# Patient Record
Sex: MALE | Race: WHITE | NOT HISPANIC OR LATINO | Employment: FULL TIME | URBAN - NONMETROPOLITAN AREA
[De-identification: names, ages, dates, MRNs, and addresses within clinical notes are randomized per-mention and may not be internally consistent; named-entity substitution may affect disease eponyms.]

---

## 2018-12-12 ENCOUNTER — TRANSCRIBE ORDERS (OUTPATIENT)
Dept: ADMINISTRATIVE | Facility: HOSPITAL | Age: 33
End: 2018-12-12

## 2018-12-12 ENCOUNTER — HOSPITAL ENCOUNTER (OUTPATIENT)
Dept: RESPIRATORY THERAPY | Facility: HOSPITAL | Age: 33
Discharge: HOME | End: 2018-12-12
Admitting: PHYSICIAN ASSISTANT

## 2018-12-12 DIAGNOSIS — R00.2 PALPITATIONS: Primary | ICD-10-CM

## 2018-12-12 DIAGNOSIS — R00.2 PALPITATIONS: ICD-10-CM

## 2018-12-12 PROCEDURE — 93226 XTRNL ECG REC<48 HR SCAN A/R: CPT

## 2018-12-12 PROCEDURE — 93225 XTRNL ECG REC<48 HRS REC: CPT

## 2018-12-14 PROCEDURE — 93227 XTRNL ECG REC<48 HR R&I: CPT | Performed by: INTERNAL MEDICINE

## 2019-08-08 ENCOUNTER — OFFICE VISIT (OUTPATIENT)
Dept: ORTHOPEDIC SURGERY | Facility: CLINIC | Age: 34
End: 2019-08-08

## 2019-08-08 VITALS — WEIGHT: 315 LBS | HEIGHT: 74 IN | HEART RATE: 183 BPM | OXYGEN SATURATION: 99 % | BODY MASS INDEX: 40.43 KG/M2

## 2019-08-08 DIAGNOSIS — M76.61 RIGHT ACHILLES TENDINITIS: Primary | ICD-10-CM

## 2019-08-08 PROCEDURE — 99203 OFFICE O/P NEW LOW 30 MIN: CPT | Performed by: PHYSICIAN ASSISTANT

## 2019-08-08 RX ORDER — NAPROXEN 250 MG/1
250 TABLET ORAL 2 TIMES DAILY PRN
COMMUNITY

## 2019-08-08 NOTE — PROGRESS NOTES
[facility] [place] [facility] Note    Subjective     Patient: Manuel Reyes  : 1985    Primary Care Provider: Nikita Esqueda PA    Requesting Provider: As above    Pain of the Right Ankle      History    Chief Complaint: Right Achilles pain    History of Present Illness: This is a very pleasant 33-year-old male presenting today with complaints of right Achilles and heel pain since 2019.  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  Initially it was not very painful but later that day when he went to get up from a seated position he had excruciating pain.  He denies any swelling warmth or erythema.  He denies any radiating pain.  He rates pain to be 5/10 and dull and aching.  He is treated with anti-inflammatories, splinting, knee walker.  He has been nonweightbearing.  He has had no prior problems with his heel.  He is here for further evaluation and treatment recommendations.  He did have MRI on 2019 as well as x-rays on 2019.    Current Outpatient Medications on File Prior to Visit   Medication Sig Dispense Refill   • naproxen (NAPROSYN) 250 MG tablet Take 250 mg by mouth 2 (Two) Times a Day As Needed.       No current facility-administered medications on file prior to visit.       No Known Allergies   History reviewed. No pertinent past medical history.  History reviewed. No pertinent surgical history.  Family History   Problem Relation Age of Onset   • Hypertension Mother    • Parkinsonism Mother    • Diabetes Father    • Hypertension Father       Social History     Socioeconomic History   • Marital status:      Spouse name: Not on file   • Number of children: Not on file   • Years of education: Not on file   • Highest education level: Not on file   Tobacco Use   • Smoking status: Never Smoker   • Smokeless tobacco: Current User     Types: Chew   Substance and Sexual Activity   • Alcohol use:  "No     Frequency: Never   • Drug use: No   • Sexual activity: Defer        Review of Systems   Constitutional: Negative.    HENT: Negative.    Eyes: Negative.    Respiratory: Negative.    Cardiovascular: Negative.    Gastrointestinal: Negative.    Endocrine: Negative.    Genitourinary: Negative.    Musculoskeletal: Positive for joint swelling.   Skin: Negative.    Allergic/Immunologic: Negative.    Neurological: Negative.    Hematological: Negative.    Psychiatric/Behavioral: Negative.        The following portions of the patient's history were reviewed and updated as appropriate: allergies, current medications, past family history, past medical history, past social history, past surgical history and problem list.      Objective      Physical Exam  Pulse (!) 183   Ht 188 cm (74\")   Wt (!) 150 kg (330 lb)   SpO2 99%   BMI 42.37 kg/m²     Body mass index is 42.37 kg/m².    GENERAL: Body habitus: morbidly obese    Lower extremity edema: Left: trace; Right: trace    Varicose veins:  Left: none; Right: none    Gait: using knee walker     Mental Status:  awake and alert; oriented to person, place, and time    Voice:  clear  SKIN:  Normal    Hair Growth:  Right:normal; Left:  normal  HEENT: Head: Normocephalic, atraumatic,  without obvious abnormality.  eye: normal external eye, no icterus   PULM:  Repiratory effort normal    Ortho Exam  V:  Dorsalis Pedis:  Right: 2+; Left:2+    Posterior Tibial: Right:2+; Left:2+    Capillary Refill:  Brisk  MSK:  Hand:right handed      Tibia:  Right:  non tender; Left:  non tender      Ankle:  Right: tender Over the insertion of the Achilles tendon with no warmth or swelling.  Negative Key's test, ROM  normal and motor function  normal; Left:  non tender, ROM  normal and motor function  normal      Foot:  Right:  non tender; Left:  non tender      NEURO: Heel Walking:  Right:  painful; Left:  normal    Toe Walking:  Right:  limited ability, painful; Left:  " "normal     [place]-Gilbert 5.07 monofilament test: not evaluated    Lower extremity sensation: intact     Calf Atrophy:none    Motor Function: all 5/5          Medical Decision Making    Data Review:   reviewed radiology images    Assessment:  1. Right Achilles tendinitis        Plan:  Right insertional Achilles tendonitis, retrocalcaneal bursitis.  I reviewed MRI findings, clinical findings with the patient.  MRI from 8/7/2019 shows edema at the insertion of the Achilles tendon with no evidence of full-thickness tear.  I explained to the patient that this edema and bursitis with \"partial tear\" is the definition of tendinitis.  I do not see any evidence of rupture.  I have explained the problem to the patient in detail.  It is generally thought to be an overuse syndrome or due to chronic aging change.   Literature shows it is best treated with a 12 week course of physical therapy and night splinting.  I have shown the patient the 2 stretches to do at home (in addition to what PT shows them), and recommend they do them 5 reps, 6-8 times per day.  I explained that injections and orthotics will not help.  I wrote a prescription for physical therapy, and we explained where to obtain a night splint.. I have also given him samples of pen said    Follow-up in 4 months if not improved.    If not improved, I explained the next step is a short leg walking cast for 6 weeks.    Surgery is a last resort as it is only helpful in about 60% of the time in improving the pain from this problem.    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  Recommendation today is that he begin physical therapy and night splinting.  We have given him a tall walking boot for comfort.  He should remain off work until Tuesday, September 3.  He can come out of the boot when he is comfortable.  He understands a physical therapy and night splinting does not resolve his pain, he will return for " casting.    History, diagnosis and treatment plan discussed with Dr. Viera.              Zelda Grace PA-C  08/09/19  8:32 AM

## 2019-08-13 ENCOUNTER — TELEPHONE (OUTPATIENT)
Dept: ORTHOPEDIC SURGERY | Facility: CLINIC | Age: 34
End: 2019-08-13

## 2019-08-13 NOTE — TELEPHONE ENCOUNTER
PT called to let our office know the following information:  He wanted to reiterate that he has not had this problem before - has not ever been treated for tendonitis. He has been discussing this situation with his worker's comp and wanted our office to be aware of his concerns.

## 2019-08-19 ENCOUNTER — TELEPHONE (OUTPATIENT)
Dept: ORTHOPEDIC SURGERY | Facility: CLINIC | Age: 34
End: 2019-08-19

## 2019-08-20 ENCOUNTER — TELEPHONE (OUTPATIENT)
Dept: ORTHOPEDIC SURGERY | Facility: CLINIC | Age: 34
End: 2019-08-20

## 2019-08-20 NOTE — TELEPHONE ENCOUNTER
Returned call to pt and let him know I faxed his WC form to [facility] prior to 10 a.m. I was unable to confirm receipt with Darleen Lopez at [facility] but did send it. I LVM for Darleen after speaking with pt.     On 8/20 noticed there was a telephone message that had been routed by Laine to clinic pool to Pankaj asking for someone to contact Darleen Lopez back regarding some questions. I've contacted her today at 4:15 and LVM to return my call at my direct number.

## 2019-08-29 ENCOUNTER — OFFICE VISIT (OUTPATIENT)
Dept: ORTHOPEDIC SURGERY | Facility: CLINIC | Age: 34
End: 2019-08-29

## 2019-08-29 VITALS — OXYGEN SATURATION: 98 % | BODY MASS INDEX: 40.43 KG/M2 | HEIGHT: 74 IN | WEIGHT: 315 LBS | HEART RATE: 116 BPM

## 2019-08-29 DIAGNOSIS — M76.61 RIGHT ACHILLES TENDINITIS: Primary | ICD-10-CM

## 2019-08-29 PROCEDURE — 99212 OFFICE O/P EST SF 10 MIN: CPT | Performed by: PHYSICIAN ASSISTANT

## 2019-08-29 NOTE — PROGRESS NOTES
[facility] [place] [facility] [place]    Subjective     Patient: Manuel Reyes  : 1985    Primary Care Provider: Nikita Esqueda PA    Requesting Provider: As above    Follow-up of the Right Ankle (Right Achilles tendinitis //)      History    Chief Complaint: Follow-up right Achilles tendinitis    History of Present Illness: Patient returns today for follow-up of his right Achilles Achilles tendinitis.  Unfortunately, he was just able to start physical therapy this week because Worker's Comp. just approved it.  He has been weightbearing in the boot and sleeping in a night splint.  He reports he has difficulty walking out of the boot at this point.  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  He is here for further treatment recommendations.    Current Outpatient Medications on File Prior to Visit   Medication Sig Dispense Refill   • naproxen (NAPROSYN) 250 MG tablet Take 250 mg by mouth 2 (Two) Times a Day As Needed.       No current facility-administered medications on file prior to visit.       No Known Allergies   History reviewed. No pertinent past medical history.  History reviewed. No pertinent surgical history.  Family History   Problem Relation Age of Onset   • Hypertension Mother    • Parkinsonism Mother    • Diabetes Father    • Hypertension Father       Social History     Socioeconomic History   • Marital status:      Spouse name: Not on file   • Number of children: Not on file   • Years of education: Not on file   • Highest education level: Not on file   Tobacco Use   • Smoking status: Never Smoker   • Smokeless tobacco: Current User     Types: Chew   Substance and Sexual Activity   • Alcohol use: No     Frequency: Never   • Drug use: No   • Sexual activity: Defer        Review of Systems   Constitutional: Negative.    HENT: Negative.    Eyes: Negative.    Respiratory: Negative.    Cardiovascular:  "Negative.    Gastrointestinal: Negative.    Endocrine: Negative.    Genitourinary: Negative.    Musculoskeletal: Positive for arthralgias.   Skin: Negative.    Allergic/Immunologic: Negative.    Neurological: Negative.    Hematological: Negative.    Psychiatric/Behavioral: Negative.        The following portions of the patient's history were reviewed and updated as appropriate: allergies, current medications, past family history, past medical history, past social history, past surgical history and problem list.      Objective      Physical Exam  Pulse 116   Ht 188 cm (74.02\")   Wt (!) 150 kg (330 lb 11 oz)   SpO2 98%   BMI 42.44 kg/m²     Body mass index is 42.44 kg/m².    Patient is well developed, well nourished and in no acute distress.  Alert and oriented x 3.    Ortho Exam  V:  Dorsalis Pedis:  Right: 2+;     Posterior Tibial: Right:2+;     Capillary Refill:  Brisk  MSK:      Tibia:  Right:  non tender;    Ankle:  Right: tender over the insertion of the achilles tendon;     Foot:  Right:  non tender;       NEURO:     [place]-Gilbert 5.07 monofilament test: not evaluated    Lower extremity sensation: intact     Calf Atrophy:none    Motor Function: all 5/5            Medical Decision Making    Data Review:   none    Assessment:  1. Right Achilles tendinitis        Plan:  Right Achilles tendinitis.  Patient is  over the insertion of the Achilles tendon.  He has just started physical therapy he is sleeping in a night splint.  I can encouraged him to continue with therapy stretching on his own and night splinting.  He should wean himself out of the boot as he has as he is comfortable.  Recommendation that is that he stay off work for 1 month until he returns to clinic for further evaluation and treatment recommendations.      Zelda Grace PA-C  08/30/19  9:52 AM    "

## 2019-10-02 ENCOUNTER — OFFICE VISIT (OUTPATIENT)
Dept: ORTHOPEDIC SURGERY | Facility: CLINIC | Age: 34
End: 2019-10-02

## 2019-10-02 VITALS — OXYGEN SATURATION: 98 % | HEART RATE: 125 BPM | WEIGHT: 315 LBS | BODY MASS INDEX: 40.43 KG/M2 | HEIGHT: 74 IN

## 2019-10-02 DIAGNOSIS — M76.61 RIGHT ACHILLES TENDINITIS: Primary | ICD-10-CM

## 2019-10-02 PROCEDURE — 99212 OFFICE O/P EST SF 10 MIN: CPT | Performed by: PHYSICIAN ASSISTANT

## 2019-10-02 NOTE — PROGRESS NOTES
[facility] [place] [facility] [place]    Subjective     Patient: Manuel Reyes  : 1985    Primary Care Provider: Nikita Esqueda PA    Requesting Provider: As above    Follow-up (1 month follow up -Right Achilles tendinitis )      History    Chief Complaint: Right Achilles tendinitis    History of Present Illness: Patient comes in today for follow-up of his right Achilles tendinitis.  He has been off work.  He has been doing physical therapy and has weaned himself out of the boot.  He reports about 50% improved pain.  He still has pain if he starts walking a fair distance.  He is back into his boot that he generally works in.  He has been using topical anti-inflammatory as well.  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      Current Outpatient Medications on File Prior to Visit   Medication Sig Dispense Refill   • naproxen (NAPROSYN) 250 MG tablet Take 250 mg by mouth 2 (Two) Times a Day As Needed.       No current facility-administered medications on file prior to visit.       No Known Allergies   History reviewed. No pertinent past medical history.  No past surgical history on file.  Family History   Problem Relation Age of Onset   • Hypertension Mother    • Parkinsonism Mother    • Diabetes Father    • Hypertension Father       Social History     Socioeconomic History   • Marital status:      Spouse name: Not on file   • Number of children: Not on file   • Years of education: Not on file   • Highest education level: Not on file   Tobacco Use   • Smoking status: Never Smoker   • Smokeless tobacco: Current User     Types: Chew   Substance and Sexual Activity   • Alcohol use: No     Frequency: Never   • Drug use: No   • Sexual activity: Defer        Review of Systems   Constitutional: Negative.    HENT: Negative.    Eyes: Negative.    Respiratory: Negative.    Cardiovascular: Negative.   "  Gastrointestinal: Negative.    Endocrine: Negative.    Genitourinary: Negative.    Musculoskeletal: Positive for arthralgias.   Skin: Negative.    Allergic/Immunologic: Negative.    Neurological: Negative.    Hematological: Negative.    Psychiatric/Behavioral: Negative.        The following portions of the patient's history were reviewed and updated as appropriate: allergies, current medications, past family history, past medical history, past social history, past surgical history and problem list.      Objective      Physical Exam  Pulse (!) 125   Ht 188 cm (74.02\")   Wt (!) 152 kg (335 lb 8.6 oz)   SpO2 98%   BMI 43.06 kg/m²     Body mass index is 43.06 kg/m².    Patient is well developed, well nourished and in no acute distress.  Alert and oriented x 3.    Ortho Exam  V:  Dorsalis Pedis:  Right: 2+;     Posterior Tibial: Right:2+;     Capillary Refill:  Brisk  MSK:      Tibia:  Right:  non tender;    Ankle:  Right: tender Over the insertion of the Achilles tendon and proximally, ROM  normal and motor function  normal;     Foot:  Right:  non tender;       NEURO:     [place]-Gilbert 5.07 monofilament test: not evaluated    Lower extremity sensation: intact     Calf Atrophy:none    Motor Function: all 5/5            Medical Decision Making    Data Review:   none    Assessment:  1. Right Achilles tendinitis        Plan:  Right Achilles tendinitis slowly improving with physical therapy, boot and anti-inflammatories.  On exam, he is  at the insertion and proximally.  He has good strength and motion.  Patient complains of pain that is still 5/10.  We discussed further treatment options including continued physical therapy which she is done for 2 months already.  I explained that 3 months is our standard physical therapy treatment plan.  If after 3 months pain has not resolved, the next step is a walking cast for 6 PM weeks to mobilize the Achilles.  We discussed today further treatment options " including finishing out therapy and night splinting for the next month or going into a weightbearing fiberglass cast.  At this point patient would like to continue PT.  I will keep him off of work until he returns to see me in 1 month or sooner if needed.      Zelda Grace PA-C  10/03/19  2:59 PM

## 2019-10-07 ENCOUNTER — TELEPHONE (OUTPATIENT)
Dept: ORTHOPEDIC SURGERY | Facility: CLINIC | Age: 34
End: 2019-10-07

## 2019-10-07 NOTE — TELEPHONE ENCOUNTER
I called him and he is going to come in tomorrow at 3:00 for me to put his cast on his right foot. He will have a .  Laura

## 2019-10-07 NOTE — TELEPHONE ENCOUNTER
PATIENT SAID LONNIE TOLD HIM AT HIS LAST APPT ON 10/02/2019 THAT HE MAY NEED A CAST, BUT HE DECLINED AT THE TIME. SHE TOLD HIM TO CALL BACK IF HE WAS IN MORE PAIN AND FELT LIKE HE NEEDED IT. SAID HE FEELS LIKE HE NEEDS IT NOW. WANTS TO COME IN ASAP.

## 2019-10-08 ENCOUNTER — OFFICE VISIT (OUTPATIENT)
Dept: ORTHOPEDIC SURGERY | Facility: CLINIC | Age: 34
End: 2019-10-08

## 2019-10-08 DIAGNOSIS — M76.61 TENDONITIS, ACHILLES, RIGHT: Primary | ICD-10-CM

## 2019-10-08 PROCEDURE — 29425 APPL SHORT LEG CAST WALKING: CPT | Performed by: PHYSICIAN ASSISTANT

## 2019-10-08 NOTE — PROGRESS NOTES
Procedure   Cast Application  Date/Time: 10/8/2019 1:48 PM  Performed by: Shanell Morales PA-C  Authorized by: Shanell Morales PA-C   Location details: right leg  Splint type: short leg  Supplies used: cotton padding (fiberglass)  Post-procedure: The splinted body part was neurovascularly unchanged following the procedure.  Patient tolerance: Patient tolerated the procedure well with no immediate complications  Comments: Placed patient in short leg WB fiberglass cast on his right leg.  Patient was comfortable upon leaving and was instructed to call with any problems.  Laura

## 2019-10-18 ENCOUNTER — TELEPHONE (OUTPATIENT)
Dept: ORTHOPEDIC SURGERY | Facility: CLINIC | Age: 34
End: 2019-10-18

## 2019-10-18 NOTE — TELEPHONE ENCOUNTER
I called him and he explains the cast is cracking around his foot. He won't be able to make it today for me to change this out. I suggested he wrap some duct tape around it to reinforce it and he is coming in on monday for me to change it out.  Laura

## 2019-11-04 ENCOUNTER — TELEPHONE (OUTPATIENT)
Dept: ORTHOPEDIC SURGERY | Facility: CLINIC | Age: 34
End: 2019-11-04

## 2019-11-04 NOTE — TELEPHONE ENCOUNTER
GOT HIS CAST SOAKING WET LAST NIGHT, DIDN'T KNOW IF IT WOULD DRY OR IF HE NEEDS TO COME IN TO HAVE IT REDONE. CALL BACK #[phone]

## 2019-11-22 ENCOUNTER — OFFICE VISIT (OUTPATIENT)
Dept: ORTHOPEDIC SURGERY | Facility: CLINIC | Age: 34
End: 2019-11-22

## 2019-11-22 ENCOUNTER — TELEPHONE (OUTPATIENT)
Dept: ORTHOPEDIC SURGERY | Facility: CLINIC | Age: 34
End: 2019-11-22

## 2019-11-22 VITALS — WEIGHT: 315 LBS | HEIGHT: 74 IN | BODY MASS INDEX: 40.43 KG/M2 | OXYGEN SATURATION: 98 % | HEART RATE: 100 BPM

## 2019-11-22 DIAGNOSIS — M76.61 TENDONITIS, ACHILLES, RIGHT: Primary | ICD-10-CM

## 2019-11-22 PROCEDURE — 99212 OFFICE O/P EST SF 10 MIN: CPT | Performed by: PHYSICIAN ASSISTANT

## 2019-11-22 RX ORDER — LORATADINE 10 MG/1
10 TABLET ORAL DAILY
Refills: 0 | COMMUNITY
Start: 2019-10-03

## 2019-11-22 RX ORDER — FLUTICASONE PROPIONATE 50 MCG
2 SPRAY, SUSPENSION (ML) NASAL DAILY
Refills: 1 | COMMUNITY
Start: 2019-10-03

## 2019-11-22 NOTE — PROGRESS NOTES
ESTABLISHED PATIENT    Patient: Manuel Reyes  : 1985    Primary Care Provider: Nikita Esqueda PA    Requesting Provider: As above    Follow-up of the Right Ankle (7 week f/u/Tendonitis, Achilles)      History    Chief Complaint: Follow-up right Achilles tendinitis    History of Present Illness: Patient returns today for follow-up of his right Achilles tendinitis.  He has been in a cast for 6 weeks.  He reports improved pain but still occasional pain 3 to 4 days a week in the cast.  He has no new symptoms.    Current Outpatient Medications on File Prior to Visit   Medication Sig Dispense Refill   • fluticasone (FLONASE) 50 MCG/ACT nasal spray 2 sprays by Each Nare route Daily.  1   • loratadine (CLARITIN) 10 MG tablet Take 10 mg by mouth Daily.  0   • naproxen (NAPROSYN) 250 MG tablet Take 250 mg by mouth 2 (Two) Times a Day As Needed.       No current facility-administered medications on file prior to visit.       No Known Allergies   History reviewed. No pertinent past medical history.  No past surgical history on file.  Family History   Problem Relation Age of Onset   • Hypertension Mother    • Parkinsonism Mother    • Diabetes Father    • Hypertension Father       Social History     Socioeconomic History   • Marital status:      Spouse name: Not on file   • Number of children: Not on file   • Years of education: Not on file   • Highest education level: Not on file   Tobacco Use   • Smoking status: Never Smoker   • Smokeless tobacco: Current User     Types: Chew   Substance and Sexual Activity   • Alcohol use: No     Frequency: Never   • Drug use: No   • Sexual activity: Defer        Review of Systems   Constitutional: Negative.    HENT: Negative.    Eyes: Negative.    Respiratory: Negative.    Cardiovascular: Negative.    Gastrointestinal: Negative.    Endocrine: Negative.    Genitourinary: Negative.    Musculoskeletal: Positive for arthralgias.   Skin: Negative.    Allergic/Immunologic: Negative.  "   Neurological: Negative.    Hematological: Negative.    Psychiatric/Behavioral: Negative.        The following portions of the patient's history were reviewed and updated as appropriate: allergies, current medications, past family history, past medical history, past social history, past surgical history and problem list.    Physical Exam:   Pulse 100   Ht 188 cm (74.02\")   Wt (!) 157 kg (345 lb 14.4 oz)   SpO2 98%   BMI 44.39 kg/m²   GENERAL: Body habitus: morbidly obese    Lower extremity edema: Left: trace; Right: trace    Gait: antalgic     Mental Status:  awake and alert; oriented to person, place, and time  MSK:  Tibia:  Right:  non tender; Left:  non tender        Ankle:  Right: tender Over the insertion of the Achilles tendon; Left:  non tender        Foot:  Right:  non tender; Left:  non tender    NEURO Sensation:  intact    Medical Decision Making    Data Review:   none    Assessment/Plan/Diagnosis/Treatment Options:   Right insertional Achilles tendinitis.  I reviewed today's clinical findings with the patient.  On exam he is still mildly tender over the insertion of the Achilles tendon.  I reminded him that we did not expect him to be 100% improved when coming out of the cast.  Recommendation today is that he return to stretching and night splinting.  I reviewed with him that the most important stretches the toe pull stretch.  He will go back into the boot if he needs to and wean himself out of the boot.  I will see him back in 1 month or sooner if needed.    Patient history, diagnosis and treatment plan discussed with Dr. Beltran.                          "

## 2019-11-22 NOTE — TELEPHONE ENCOUNTER
Zelda said he can do the stretches and call us if he wants to do more PT later. He understood.  Laura

## 2019-11-22 NOTE — TELEPHONE ENCOUNTER
PATIENT CALLED REGARDING PHYSICAL THERAPY. HE DIDN'T KNOW IF HE WAS SUPPOSED TO CONTINUE THERAPY OR NOT. HE CAN BE REACHED -[phone].

## 2019-12-02 ENCOUNTER — TELEPHONE (OUTPATIENT)
Dept: ORTHOPEDIC SURGERY | Facility: CLINIC | Age: 34
End: 2019-12-02

## 2019-12-17 ENCOUNTER — TELEPHONE (OUTPATIENT)
Dept: ORTHOPEDIC SURGERY | Facility: CLINIC | Age: 34
End: 2019-12-17

## 2019-12-17 DIAGNOSIS — S86.001D INJURY OF RIGHT ACHILLES TENDON, SUBSEQUENT ENCOUNTER: ICD-10-CM

## 2019-12-17 DIAGNOSIS — M76.61 TENDONITIS, ACHILLES, RIGHT: Primary | ICD-10-CM

## 2019-12-27 ENCOUNTER — TELEPHONE (OUTPATIENT)
Dept: ORTHOPEDIC SURGERY | Facility: CLINIC | Age: 34
End: 2019-12-27

## 2019-12-27 ENCOUNTER — OFFICE VISIT (OUTPATIENT)
Dept: ORTHOPEDIC SURGERY | Facility: CLINIC | Age: 34
End: 2019-12-27

## 2019-12-27 VITALS — HEIGHT: 74 IN | BODY MASS INDEX: 40.43 KG/M2 | OXYGEN SATURATION: 98 % | WEIGHT: 315 LBS | HEART RATE: 85 BPM

## 2019-12-27 DIAGNOSIS — M76.61 TENDONITIS, ACHILLES, RIGHT: Primary | ICD-10-CM

## 2019-12-27 PROCEDURE — 99212 OFFICE O/P EST SF 10 MIN: CPT | Performed by: PHYSICIAN ASSISTANT

## 2019-12-27 NOTE — TELEPHONE ENCOUNTER
PATIENT REQUESTING A WORK NOTE, SAID LONNIE SAID HE COULD BE OFF WORK UNTIL SEEN AT . WANTED TO CONFIRM BEFORE CREATING LETTER.

## 2019-12-27 NOTE — PROGRESS NOTES
[facility] [place] [facility] Note    Subjective     Patient: Manuel Reyes  : 1985    Primary Care Provider: Nikita Esqueda PA    Requesting Provider: As above    Follow-up (1 month follow up; Right insertional Achilles tendinitis)      History    Chief Complaint: Follow-up right Achilles tendinitis    History of Present Illness: Patient returns today for follow-up of his right Achilles tendinitis.  He continues to have persisting pain that is 5/10.  He did have a period of time after casting that he felt better but then as he began stretching it became more painful the longer he was on it.  He complains of pain and stiffness and popping.  He has been off work since the beginning of August secondary to this he has had MRI in August showing tendinitis with no rupture.  He is treated with physical therapy, night splinting, casting, continued stretching, topical anti-inflammatories, boot wear with persisting pain and dysfunction.  He is here for further evaluation and treatment recommendations.    Current Outpatient Medications on File Prior to Visit   Medication Sig Dispense Refill   • fluticasone (FLONASE) 50 MCG/ACT nasal spray 2 sprays by Each Nare route Daily.  1   • loratadine (CLARITIN) 10 MG tablet Take 10 mg by mouth Daily.  0   • naproxen (NAPROSYN) 250 MG tablet Take 250 mg by mouth 2 (Two) Times a Day As Needed.       No current facility-administered medications on file prior to visit.       No Known Allergies   History reviewed. No pertinent past medical history.  History reviewed. No pertinent surgical history.  Family History   Problem Relation Age of Onset   • Hypertension Mother    • Parkinsonism Mother    • Diabetes Father    • Hypertension Father       Social History     Socioeconomic History   • Marital status:      Spouse name: Not on file   • Number of children: Not on file   • Years of education: Not on file   • Highest education level: Not on file   Tobacco Use   •  "Smoking status: Never Smoker   • Smokeless tobacco: Current User     Types: Chew   Substance and Sexual Activity   • Alcohol use: No     Frequency: Never   • Drug use: No   • Sexual activity: Defer        Review of Systems   Constitutional: Positive for activity change.   HENT: Negative.    Eyes: Negative.    Respiratory: Negative.    Cardiovascular: Negative.    Gastrointestinal: Negative.    Endocrine: Negative.    Genitourinary: Negative.    Musculoskeletal: Positive for arthralgias.   Skin: Negative.    Allergic/Immunologic: Negative.    Neurological: Negative.    Hematological: Negative.    Psychiatric/Behavioral: Negative.        The following portions of the patient's history were reviewed and updated as appropriate: allergies, current medications, past family history, past medical history, past social history, past surgical history and problem list.      Objective      Physical Exam  Pulse 85   Ht 188 cm (74.02\")   Wt (!) 150 kg (330 lb)   SpO2 98%   BMI 42.35 kg/m²     Body mass index is 42.35 kg/m².    Patient is well developed, well nourished and in no acute distress.  Alert and oriented x 3.    Ortho Exam  V:  Dorsalis Pedis:  Right: 2+;     Posterior Tibial: Right:2+;     Capillary Refill:  Brisk  MSK:      Tibia:  Right:  non tender;    Ankle:  Right: tender Over the insertion of the Achilles tendon, ROM  normal and motor function  normal;     Foot:  Right:  non tender;       NEURO:     [place] 5.07 monofilament test: not evaluated    Lower extremity sensation: intact     Calf Atrophy:none    Motor Function: all 5/5            Medical Decision Making    Data Review:   none    Assessment:  No diagnosis found.    Plan:  Right insertional Achilles tendinitis with persisting pain despite conservative treatment.  I discussed with the patient that we have exhausted conservative treatment at this time.  He has done physical therapy, stretching, night splinting, topical anti-inflammatories, boot " with persisting pain and dysfunction.  He had MRI in August 2019.  He has been off work since that time.  [employer] has approved another MRI that is scheduled for next week.  Dr. Beltran has seen the patient and reviewed all studies with me as well.  Recommendation today is that he go to [facility] to see Dr. Rock for second opinion.  He will return to see us as needed.    Patient history, diagnosis and treatment plan discussed with Dr. Beltran.        Zelda Grace PA-C  12/27/19  2:02 PM

## 2020-01-03 DIAGNOSIS — M76.61 TENDONITIS, ACHILLES, RIGHT: ICD-10-CM

## 2020-01-03 DIAGNOSIS — S86.001D INJURY OF RIGHT ACHILLES TENDON, SUBSEQUENT ENCOUNTER: ICD-10-CM

## 2020-01-03 DIAGNOSIS — M76.61 TENDONITIS, ACHILLES, RIGHT: Primary | ICD-10-CM

## 2020-06-23 ENCOUNTER — TRANSCRIBE ORDERS (OUTPATIENT)
Dept: ADMINISTRATIVE | Facility: HOSPITAL | Age: 35
End: 2020-06-23

## 2020-06-23 DIAGNOSIS — Z01.818 OTHER SPECIFIED PRE-OPERATIVE EXAMINATION: Primary | ICD-10-CM

## 2020-06-24 ENCOUNTER — LAB (OUTPATIENT)
Dept: LAB | Facility: HOSPITAL | Age: 35
End: 2020-06-24

## 2020-06-24 DIAGNOSIS — Z01.818 OTHER SPECIFIED PRE-OPERATIVE EXAMINATION: ICD-10-CM

## 2020-06-24 LAB
REF LAB TEST METHOD: NORMAL
SARS-COV-2 RNA RESP QL NAA+PROBE: NOT DETECTED

## 2020-06-24 PROCEDURE — U0002 COVID-19 LAB TEST NON-CDC: HCPCS

## 2020-06-24 PROCEDURE — C9803 HOPD COVID-19 SPEC COLLECT: HCPCS

## 2020-06-24 PROCEDURE — U0004 COV-19 TEST NON-CDC HGH THRU: HCPCS

## 2021-06-28 ENCOUNTER — HOSPITAL ENCOUNTER (OUTPATIENT)
Dept: HOSPITAL 79 - SLEEP | Age: 36
End: 2021-06-28
Payer: COMMERCIAL

## 2021-06-28 DIAGNOSIS — R06.83: Primary | ICD-10-CM

## 2021-06-28 DIAGNOSIS — G47.33: ICD-10-CM

## 2022-10-25 DIAGNOSIS — M79.645 FINGER PAIN, LEFT: Primary | ICD-10-CM

## 2022-10-27 ENCOUNTER — APPOINTMENT (OUTPATIENT)
Dept: GENERAL RADIOLOGY | Facility: HOSPITAL | Age: 37
End: 2022-10-27